# Patient Record
Sex: FEMALE | Race: BLACK OR AFRICAN AMERICAN | NOT HISPANIC OR LATINO | URBAN - METROPOLITAN AREA
[De-identification: names, ages, dates, MRNs, and addresses within clinical notes are randomized per-mention and may not be internally consistent; named-entity substitution may affect disease eponyms.]

---

## 2020-08-10 ENCOUNTER — EMERGENCY (EMERGENCY)
Facility: HOSPITAL | Age: 30
LOS: 1 days | Discharge: HOME | End: 2020-08-10
Attending: EMERGENCY MEDICINE | Admitting: EMERGENCY MEDICINE
Payer: MEDICAID

## 2020-08-10 VITALS
DIASTOLIC BLOOD PRESSURE: 98 MMHG | TEMPERATURE: 99 F | OXYGEN SATURATION: 100 % | HEART RATE: 63 BPM | SYSTOLIC BLOOD PRESSURE: 139 MMHG | RESPIRATION RATE: 18 BRPM

## 2020-08-10 VITALS
OXYGEN SATURATION: 100 % | SYSTOLIC BLOOD PRESSURE: 141 MMHG | TEMPERATURE: 99 F | DIASTOLIC BLOOD PRESSURE: 100 MMHG | RESPIRATION RATE: 18 BRPM | HEART RATE: 75 BPM

## 2020-08-10 LAB
ALBUMIN SERPL ELPH-MCNC: 4.3 G/DL — SIGNIFICANT CHANGE UP (ref 3.3–5)
ALP SERPL-CCNC: 75 U/L — SIGNIFICANT CHANGE UP (ref 40–120)
ALT FLD-CCNC: 10 U/L — SIGNIFICANT CHANGE UP (ref 4–33)
ANION GAP SERPL CALC-SCNC: 13 MMO/L — SIGNIFICANT CHANGE UP (ref 7–14)
APPEARANCE UR: CLEAR — SIGNIFICANT CHANGE UP
AST SERPL-CCNC: 12 U/L — SIGNIFICANT CHANGE UP (ref 4–32)
BACTERIA # UR AUTO: HIGH
BASOPHILS # BLD AUTO: 0.03 K/UL — SIGNIFICANT CHANGE UP (ref 0–0.2)
BASOPHILS NFR BLD AUTO: 0.4 % — SIGNIFICANT CHANGE UP (ref 0–2)
BILIRUB SERPL-MCNC: < 0.2 MG/DL — LOW (ref 0.2–1.2)
BILIRUB UR-MCNC: NEGATIVE — SIGNIFICANT CHANGE UP
BLD GP AB SCN SERPL QL: NEGATIVE — SIGNIFICANT CHANGE UP
BLOOD UR QL VISUAL: HIGH
BUN SERPL-MCNC: 10 MG/DL — SIGNIFICANT CHANGE UP (ref 7–23)
CALCIUM SERPL-MCNC: 9.2 MG/DL — SIGNIFICANT CHANGE UP (ref 8.4–10.5)
CHLORIDE SERPL-SCNC: 105 MMOL/L — SIGNIFICANT CHANGE UP (ref 98–107)
CO2 SERPL-SCNC: 21 MMOL/L — LOW (ref 22–31)
COLOR SPEC: SIGNIFICANT CHANGE UP
CREAT SERPL-MCNC: 0.62 MG/DL — SIGNIFICANT CHANGE UP (ref 0.5–1.3)
EOSINOPHIL # BLD AUTO: 0.06 K/UL — SIGNIFICANT CHANGE UP (ref 0–0.5)
EOSINOPHIL NFR BLD AUTO: 0.8 % — SIGNIFICANT CHANGE UP (ref 0–6)
GLUCOSE SERPL-MCNC: 90 MG/DL — SIGNIFICANT CHANGE UP (ref 70–99)
GLUCOSE UR-MCNC: NEGATIVE — SIGNIFICANT CHANGE UP
HCG SERPL-ACNC: 304.6 MIU/ML — SIGNIFICANT CHANGE UP
HCT VFR BLD CALC: 32.1 % — LOW (ref 34.5–45)
HGB BLD-MCNC: 8.9 G/DL — LOW (ref 11.5–15.5)
HYALINE CASTS # UR AUTO: NEGATIVE — SIGNIFICANT CHANGE UP
IMM GRANULOCYTES NFR BLD AUTO: 0.7 % — SIGNIFICANT CHANGE UP (ref 0–1.5)
KETONES UR-MCNC: NEGATIVE — SIGNIFICANT CHANGE UP
LEUKOCYTE ESTERASE UR-ACNC: SIGNIFICANT CHANGE UP
LYMPHOCYTES # BLD AUTO: 2.79 K/UL — SIGNIFICANT CHANGE UP (ref 1–3.3)
LYMPHOCYTES # BLD AUTO: 38.3 % — SIGNIFICANT CHANGE UP (ref 13–44)
MCHC RBC-ENTMCNC: 18.2 PG — LOW (ref 27–34)
MCHC RBC-ENTMCNC: 27.7 % — LOW (ref 32–36)
MCV RBC AUTO: 65.8 FL — LOW (ref 80–100)
MONOCYTES # BLD AUTO: 0.58 K/UL — SIGNIFICANT CHANGE UP (ref 0–0.9)
MONOCYTES NFR BLD AUTO: 8 % — SIGNIFICANT CHANGE UP (ref 2–14)
NEUTROPHILS # BLD AUTO: 3.78 K/UL — SIGNIFICANT CHANGE UP (ref 1.8–7.4)
NEUTROPHILS NFR BLD AUTO: 51.8 % — SIGNIFICANT CHANGE UP (ref 43–77)
NITRITE UR-MCNC: NEGATIVE — SIGNIFICANT CHANGE UP
NRBC # FLD: 0 K/UL — SIGNIFICANT CHANGE UP (ref 0–0)
PH UR: 6.5 — SIGNIFICANT CHANGE UP (ref 5–8)
PLATELET # BLD AUTO: 333 K/UL — SIGNIFICANT CHANGE UP (ref 150–400)
PMV BLD: 8.8 FL — SIGNIFICANT CHANGE UP (ref 7–13)
POTASSIUM SERPL-MCNC: 4.2 MMOL/L — SIGNIFICANT CHANGE UP (ref 3.5–5.3)
POTASSIUM SERPL-SCNC: 4.2 MMOL/L — SIGNIFICANT CHANGE UP (ref 3.5–5.3)
PROT SERPL-MCNC: 7.2 G/DL — SIGNIFICANT CHANGE UP (ref 6–8.3)
PROT UR-MCNC: 10 — SIGNIFICANT CHANGE UP
RBC # BLD: 4.88 M/UL — SIGNIFICANT CHANGE UP (ref 3.8–5.2)
RBC # FLD: 19.8 % — HIGH (ref 10.3–14.5)
RBC CASTS # UR COMP ASSIST: HIGH (ref 0–?)
RH IG SCN BLD-IMP: POSITIVE — SIGNIFICANT CHANGE UP
SODIUM SERPL-SCNC: 139 MMOL/L — SIGNIFICANT CHANGE UP (ref 135–145)
SP GR SPEC: 1.02 — SIGNIFICANT CHANGE UP (ref 1–1.04)
SQUAMOUS # UR AUTO: SIGNIFICANT CHANGE UP
UROBILINOGEN FLD QL: NORMAL — SIGNIFICANT CHANGE UP
WBC # BLD: 7.29 K/UL — SIGNIFICANT CHANGE UP (ref 3.8–10.5)
WBC # FLD AUTO: 7.29 K/UL — SIGNIFICANT CHANGE UP (ref 3.8–10.5)
WBC UR QL: HIGH (ref 0–?)

## 2020-08-10 PROCEDURE — 99284 EMERGENCY DEPT VISIT MOD MDM: CPT

## 2020-08-10 PROCEDURE — 76817 TRANSVAGINAL US OBSTETRIC: CPT | Mod: 26

## 2020-08-10 PROCEDURE — 76815 OB US LIMITED FETUS(S): CPT | Mod: 26

## 2020-08-10 PROCEDURE — 76830 TRANSVAGINAL US NON-OB: CPT | Mod: 26

## 2020-08-10 PROCEDURE — 93976 VASCULAR STUDY: CPT | Mod: 26

## 2020-08-10 RX ORDER — CEPHALEXIN 500 MG
500 CAPSULE ORAL ONCE
Refills: 0 | Status: COMPLETED | OUTPATIENT
Start: 2020-08-10 | End: 2020-08-10

## 2020-08-10 RX ADMIN — Medication 500 MILLIGRAM(S): at 23:45

## 2020-08-10 NOTE — ED PROVIDER NOTE - PATIENT PORTAL LINK FT
You can access the FollowMyHealth Patient Portal offered by [facility] by registering at the following website: [email or web address]. By joining [employer]’s FollowMyHealth portal, you will also be able to view your health information using other applications (apps) compatible with our system.

## 2020-08-10 NOTE — ED PROVIDER NOTE - NS ED ROS FT
Review of Systems:  Constitutional: No fever, No weight loss, good appetite/po intake  Head: No headache   Eyes: No blurry vision, No diplopia  Neuro: No tremors, No muscle weakness   Cardiovascular: No chest pain, No palpitations  Respiratory: No increased respiratory effort, No SOB, No cough  GI: No nausea, No vomiting, No diarrhea  : +vaginal bleeding, +pelvic cramps  Skin: No rash  MSK: No joint pain   All other systems negative except as per HPI

## 2020-08-10 NOTE — ED PROVIDER NOTE - CLINICAL SUMMARY MEDICAL DECISION MAKING FREE TEXT BOX
29 yr old F  presents with vaginal bleeding of two days' duration with positive home pregnancy test x3. Patient is hemodynamically stable. Concern for ruptured ectopic pregnancy vs. threatened . Will obtain repeat UHcG, blood work, transvaginal US.

## 2020-08-10 NOTE — ED PROVIDER NOTE - NSFOLLOWUPINSTRUCTIONS_ED_ALL_ED_FT
Follow up with your ob in 2 days for repeat us and hcg.      HCG Quantitative, Serum (08.10.20 @ 20:55)    HCG Quantitative, Serum: 304.6 on 8/10/2020    Vaginal Bleeding During Pregnancy, First Trimester    A small amount of bleeding from the vagina (spotting) is relatively common during early pregnancy. It usually stops on its own. Various things may cause bleeding or spotting during early pregnancy. Some bleeding may be related to the pregnancy, and some may not. In many cases, the bleeding is normal and is not a problem. However, bleeding can also be a sign of something serious. Be sure to tell your health care provider about any vaginal bleeding right away.    Some possible causes of vaginal bleeding during the first trimester include:  Infection or inflammation of the cervix.  Growths (polyps) on the cervix.  Miscarriage or threatened miscarriage.  Pregnancy tissue developing outside of the uterus (ectopic pregnancy).  A mass of tissue developing in the uterus due to an egg being fertilized incorrectly (molar pregnancy).  Follow these instructions at home:  Activity     Follow instructions from your health care provider about limiting your activity. Ask what activities are safe for you.  If needed, make plans for someone to help with your regular activities.  Do not have sex or orgasms until your health care provider says that this is safe.  General instructions     Take over-the-counter and prescription medicines only as told by your health care provider.  Pay attention to any changes in your symptoms.  Do not use tampons or douche.  Write down how many pads you use each day, how often you change pads, and how soaked (saturated) they are.  If you pass any tissue from your vagina, save the tissue so you can show it to your health care provider.  Keep all follow-up visits as told by your health care provider. This is important.  Contact a health care provider if:  You have vaginal bleeding during any part of your pregnancy.  You have cramps or labor pains.  You have a fever.  Get help right away if:  You have severe cramps in your back or abdomen.  You pass large clots or a large amount of tissue from your vagina.  Your bleeding increases.  You feel light-headed or weak, or you faint.  You have chills.  You are leaking fluid or have a gush of fluid from your vagina.  Summary  A small amount of bleeding (spotting) from the vagina is relatively common during early pregnancy.  Various things may cause bleeding or spotting in early pregnancy.  Be sure to tell your health care provider about any vaginal bleeding right away.  This information is not intended to replace advice given to you by your health care provider. Make sure you discuss any questions you have with your health care provider.

## 2020-08-10 NOTE — ED PROVIDER NOTE - PHYSICAL EXAMINATION
PHYSICAL EXAM  GENERAL: NAD, lying comfortably in bed   HEAD:  Atraumatic, Normocephalic  EYES: EOMI b/l, PERRLA b/l, conjunctiva and sclera clear  NECK: Supple, No JVD, No LAD   CHEST/LUNG: Clear to auscultation bilaterally; No crackles or wheezes  HEART: Regular rate and rhythm; S1 and S2 present, No murmurs, rubs, or gallops  ABDOMEN: Nondistended. Soft, Nontender to palpation. Bowel sounds present  EXTREMITIES:  2+ Peripheral Pulses, No clubbing, cyanosis, or edema  NEURO: AAOx3, non-focal   SKIN: No rashes or lesions  : (Supervisor: MIRANDA Alexis). Blood noted at the vaginal introitus. No tears or lacerations noted at the vaginal introitus or in the vaginal walls. Blood noted in the vaginal vault. Cervical os not open.

## 2020-08-10 NOTE — ED ADULT NURSE NOTE - OBJECTIVE STATEMENT
Pt presents to intake rm7, alert&orientedx3, ambulatory, denies pmhx coming to ED for vaginal bleeding x1day. Pt reports having menstrual cycle 2 weeks ago, took 5 pregnancy tests at home, all resulted positive. Pt states she has been having "spot-like bleeding" throughout the day, denies heavy bleeding or blood clots. Pt appears in no acute distress, denies SOB, dizziness or chest pain or abd cramping.  Breathing even and non-labored, VSS, abd soft and non-distended. 20g IV establish on left AC, labs drawn and sent, awaiting US

## 2020-08-10 NOTE — ED PROVIDER NOTE - OBJECTIVE STATEMENT
29 yr old F  with positive home pregnancy test x3 2 weeks ago presents with vaginal bleeding that began yesterday with spotting and progressed to heavy vaginal bleeding that is like her normal menses. She has used 5-6 pads today, but states she changes them prior to saturation. Pt's LMP was 2 weeks ago and she has very regular, 26-28 day cycles. She has some lower abdominal cramping that she states is similar to her baseline menstrual cramps. She has a history of a miscarriage and states the cramps she is having now are less severe than those. Most recent pap smear in February was normal, no STIs. Sexually active with BF.

## 2020-08-10 NOTE — ED ADULT TRIAGE NOTE - CHIEF COMPLAINT QUOTE
Pt took a pregnancy test yesterday and it was positive--pt had her menstruation 2 weeks ago so she was wondering why she is bleeding--pt c/o abdominal cramping and bleeding

## 2020-08-10 NOTE — ED PROVIDER NOTE - ATTENDING CONTRIBUTION TO CARE
DR. CHAN, ATTENDING MD-  I have reviewed and discussed the medical student’s documentation and findings with the student. After personally examining the patient, my findings have been added to this documentation.    30 y/o female two weeks pregnant with vag bld x2 days.  Ectopic vs threatened ab.  Obtain cbc bmp quant hcg t&s tvus ua ucx reassess.

## 2020-08-11 RX ORDER — CEPHALEXIN 500 MG
1 CAPSULE ORAL
Qty: 14 | Refills: 0
Start: 2020-08-11 | End: 2020-08-17

## 2023-12-22 NOTE — ED ADULT NURSE NOTE - NS ED NURSE DISCH DISPOSITION
"[facility] DISCHARGE SUMMARY    Discharge Date: 2023 9:22 AM   Pt Name: Aby Correa   : 1992   MRN: [id number]     Admit Date: 23      SUBJECTIVE:  Aby Correa is a 31 y.o. female with history of MDD, SABI, PTSD who is admitted to BMU for worsening depression and PTSD sx.     Program course-   The patient was admitted to [facility] on 23 for treatment of depression and anxiety, which were self-rated as moderate and moderate, respectively, on admission.  Throughout the program, the patient regularly attended scheduled group and individual therapy sessions in which a variety of therapeutic modalities and interventions were employed. Skills such as healthy communication, mindfulness, coping and risk reduction were also discussed at length.   Patient was noted to be well-engaged in groups, and participated appropriately in the program. Attendance and behavioral issues were not a problem during their admission to BMU.   Throughout her admission she met regularly with [facility]. Her medication regimen was reviewed regularly and adjusted.   On the day of discharge, patient voiced significant improvement in their symptoms. Her mood was stable, with mood-congruent affect. She denied SI/HI and evidenced no objective symptoms of psychosis or vilma on discharge.  She denied any issues with her current medication regimen, and felt it adequately treated their symptoms. Pt was discharged in stable condition to continue her behavioral health treatment in a less-acute setting. All questions were answered at their final discharge meeting.     Today,     Pt endorses doing "good." Feels mood has been improving and anxiety better controlled. Spending time with family over the holidays, and planning on going back to work in January. Not feeling as stressed about work anymore, as she feels they've been very supportive and accommodating. Sleep stable. Appetite not that great, but " trying to eat regular meals.   Had two glasses of wine yesterday, but continuing to cut back. Decreasing cannabis use as well; no longer smoking, and now taking occasional THC gummies.   Feels BMU has been very helpful, although has done a lot of therapy in the past.   Has increased Cymbalta dose to 60mg daily; denies any s/e. Continues to take Elavil and Klonopin as well, with goal of eventually tapering off Klonopin.   Denies SI/HI/AVH.      PATIENT HEALTH QUESTIONNAIRE-9 ( P H Q - 9 )- DAY OF DISCHARGE    Over the last 2 weeks, how often have you been bothered by any of the following problems?  0-Not at all  1- Several days  2- More than half the days  3- Nearly every day    Little interest or pleasure in doing things 1  Feeling down, depressed, or hopeless 2  Trouble falling or staying asleep, or sleeping too much 1  Feeling tired or having little energy 1  Poor appetite or overeating 2  Feeling bad about yourself -- or that you are a failure or have let yourself or your family down 2  Trouble concentrating on things, such as reading the newspaper or watching television 1  Moving or speaking so slowly that other people could have noticed? Or the opposite -- being so fidgety or restless that you have been moving around a lot more than usual 1  Thoughts that you would be better off dead or of hurting yourself in some way 0    Total Score: __11____    If you checked off any problems, how difficult have these problems made it for you to do your  work, take care of things at home, or get along with other people?  Somewhat difficult    Developed by Levi Miramontes, Uyen Higgins, Jose Antonio Mendieta and colleagues, with an educational sarah from  [employer] No permission required to reproduce, translate, display or distribute.    PHQ-9 Patient Depression Questionnaire Explanation  Interpretation of Total Score  Total Score Depression Severity  1-4 Minimal depression  5-9 Mild depression  10-14 Moderate  depression  15-19 Moderately severe depression  20-27 Severe depression    PHQ9 Copyright © [employer] All rights reserved. Reproduced with permission. PRIME-MD ® is a  trademark of [employer]  [id number] 10-       GENERALIZED ANXIETY DISORDER SCALE (SABI -7)    Over the last two weeks, how often have you been bothered by the following problems?  0-Not at all  1- Several days  2- More than half the days  3- Nearly every day    1. Feeling nervous, anxious, or on edge- 1  2. Not being able to stop or control worrying- 0  3. Worrying too much about different things- 1  4. Trouble relaxing- 1  5. Being so restless that it is hard to sit still- 0  6. Becoming easily annoyed or irritable- 1  7. Feeling afraid, as if something awful  might happen- 1    If you checked any problems, how difficult have they made it for you to do your work, take care of things at home, or get along with other people?  Somewhat difficult    Scoring SABI-7 Anxiety Severity =  5/21  This is calculated by assigning scores of 0, 1, 2, and 3 to the response categories, respectively, of not at all, several days, more than half the days, and nearly every day. SABI-7 total score for the seven items ranges from 0 to 21.  0-4: minimal anxiety  5-9: mild anxiety  10-14: moderate anxiety  15-21: severe anxiety    Source: Primary Care Evaluation of Mental Disorders Patient Health Questionnaire (PRIME-MD-PHQ). The PHQ was developed by Levi Miramontes, Uyen Higgins, Jose Antonio Mendieta, and colleagues. For research information, contact Dr. Miramontes at [email or web address]. PRIME-MD® is a trademark of [employer]. Copyright© 1999 [employer] All rights reserved. Reproduced with permission     Risk Parameters:  Patient reports no suicidal ideation  Patient reports no homicidal ideation  Patient reports no self-injurious behavior  Patient reports no violent behavior    Allergies:   Review of patient's allergies indicates:  No Known  "Allergies      Current Medications:   Cymbalta 60mg daily  Elavil 25mg qhs  PRN Klonopin 0.5mg BID (takes once every night and occasionally takes extra one during the day if having increased anxiety or panic attack)       OBJECTIVE:   Vitals (Most Recent)  There were no vitals filed for this visit.; defer to nursing note      Labs/Imaging/Studies:   No results found for this or any previous visit (from the past 48 hour(s)).   No results found for: "PHENYTOIN", "PHENOBARB", "VALPROATE", "CBMZ"    Mental Status Exam:  General Appearance: appears stated age, well developed and nourished, adequately groomed and appropriately dressed, in no acute distress  Behavior: normal; cooperative; reasonably friendly, pleasant, and polite; appropriate eye-contact; under good behavioral control  Involuntary Movements and Motor Activity: no abnormal involuntary movements noted; no tics, no tremors, no akathisia, no dystonia, no evidence of tardive dyskinesia; no psychomotor agitation or retardation  Gait and Station: intact, normal gait and station, ambulates without assistance  Speech and Language: intact; normal rate, rhythm, volume, tone, and pitch; conversational, spontaneous, and coherent; speaks and understands English proficiently and fluently; repeats words and phrases, no word finding difficulties are noted  Mood: "good"  Affect: normal, euthymic, reactive, full-range, mood-congruent, appropriate to situation and context  Thought Process and Associations: intact; linear, goal-directed, organized, and logical; no loosening of associations noted  Thought Content and Perceptions:: no suicidal or homicidal ideation, no auditory or visual hallucinations, no paranoid ideation, no ideas of reference, no evidence of delusions or psychosis  Sensorium and Orientation: intact; alert with clear sensorium; oriented fully to person, place, time and situation  Recent and Remote Memory: grossly intact, able to recall relevant and salient " information from the recent and remote past  Attention and Concentration: grossly intact, attentive to the conversation and not readily distractible  Fund of Knowledge: grossly intact, used appropriate vocabulary and demonstrated an awareness of current events, consistent with educational level achieved  Insight: intact, demonstrates awareness of illness and situation  Judgment: intact, behavior is adequate/appropriate to the circumstances, compliant with health provider's recommendations and instructions      AIMS: if on an antipsychotic    ASSESSMENT/PLAN:   General Assessment:  Aby Correa is a 31 y.o. female with history of MDD, SABI, PTSD who is admitted to BMU for worsening depression and PTSD sx.       Diagnosis:  MDD, recurrent episode, moderate  Unspecified anxiety  Hx of PTSD  Cannabis use, r/o disorder  Alcohol use, r/o disorder     Plan:  1. Completed BMU treatment with moderate improvement in presenting symptoms and was encouraged to attend after care groups for better transition to out pt care.     2. Psych meds  Continue current med regimen, tolerating well with out any side effects. Pt provided with enough refills until follow up appointment.  Continue Cymbalta 60mg daily, for depressive sx and anxiety  12/18/23 labs: CBC, CMP - WNL. LFT's WNL.  Continue Elavil 25mg qhs for now  Continue PRN Klonopin 0.5mg BID (takes once every night and occasionally takes extra one during the day if having increased anxiety or panic attack) for now; would recommend tapering off in near future given substance use hx.   Per pt, current med regimen helps with somatic GI sx; concerns for chronic cannabis use contributing to GI sx)  Counseled pt on substance use; pt endorsed willingness to discontinue cannabis use and decrease alcohol use while in BMU  Discuss tapering off Klonopin (especially in light of concurrent alcohol use)  Discussed with patient informed consent, risks vs. benefits, alternative treatments,  side effect profile and the inherent unpredictability of individual responses to these treatments. Answered any questions patient may have had. The patient expresses understanding of the above and displays the capacity to  agree with this.     3. Patient Discharge Instructions and informed to:-  Follow up regularly with [facility] appointments for further psychiatric care and management. Please see SW note for after care appointments  Refrain from using excessive alcohol, avoid any illicit substances and or abuse of prescription medications, as this may worsen mood and may be detrimental to health.  Call the crisis line at: [phone] for help in a crisis and emergent situations and present to ED for any worsening of psychiatric symptoms or any SI/HI/AVH    Juliane Sutton MD  [facility] PGY-IV   12/22/2023 9:22 AM        Discharged